# Patient Record
Sex: MALE | Race: WHITE | NOT HISPANIC OR LATINO | Employment: FULL TIME | URBAN - METROPOLITAN AREA
[De-identification: names, ages, dates, MRNs, and addresses within clinical notes are randomized per-mention and may not be internally consistent; named-entity substitution may affect disease eponyms.]

---

## 2018-01-31 ENCOUNTER — RECORDS - HEALTHEAST (OUTPATIENT)
Dept: LAB | Facility: CLINIC | Age: 58
End: 2018-01-31

## 2018-02-01 LAB — BACTERIA SPEC CULT: NO GROWTH

## 2018-06-08 ENCOUNTER — RECORDS - HEALTHEAST (OUTPATIENT)
Dept: LAB | Facility: CLINIC | Age: 58
End: 2018-06-08

## 2018-06-09 LAB — BACTERIA SPEC CULT: NO GROWTH

## 2019-12-23 ENCOUNTER — RECORDS - HEALTHEAST (OUTPATIENT)
Dept: ADMINISTRATIVE | Facility: OTHER | Age: 59
End: 2019-12-23

## 2019-12-23 ENCOUNTER — RECORDS - HEALTHEAST (OUTPATIENT)
Dept: LAB | Facility: CLINIC | Age: 59
End: 2019-12-23

## 2019-12-24 LAB
CCP AB SER IA-ACNC: 0.7 U/ML
RHEUMATOID FACT SERPL-ACNC: <15 IU/ML (ref 0–30)

## 2020-01-08 ENCOUNTER — RECORDS - HEALTHEAST (OUTPATIENT)
Dept: ADMINISTRATIVE | Facility: OTHER | Age: 60
End: 2020-01-08

## 2020-02-21 ENCOUNTER — RECORDS - HEALTHEAST (OUTPATIENT)
Dept: ADMINISTRATIVE | Facility: OTHER | Age: 60
End: 2020-02-21

## 2020-02-21 ENCOUNTER — RECORDS - HEALTHEAST (OUTPATIENT)
Dept: LAB | Facility: CLINIC | Age: 60
End: 2020-02-21

## 2020-02-22 LAB
ALBUMIN SERPL-MCNC: 4.3 G/DL (ref 3.5–5)
ANION GAP SERPL CALCULATED.3IONS-SCNC: 10 MMOL/L (ref 5–18)
BUN SERPL-MCNC: 17 MG/DL (ref 8–22)
CALCIUM SERPL-MCNC: 9 MG/DL (ref 8.5–10.5)
CHLORIDE BLD-SCNC: 108 MMOL/L (ref 98–107)
CO2 SERPL-SCNC: 24 MMOL/L (ref 22–31)
CREAT SERPL-MCNC: 0.88 MG/DL (ref 0.7–1.3)
GFR SERPL CREATININE-BSD FRML MDRD: >60 ML/MIN/1.73M2
GLUCOSE BLD-MCNC: 82 MG/DL (ref 70–125)
PHOSPHATE SERPL-MCNC: 2.5 MG/DL (ref 2.5–4.5)
POTASSIUM BLD-SCNC: 4.4 MMOL/L (ref 3.5–5)
SODIUM SERPL-SCNC: 142 MMOL/L (ref 136–145)
URATE SERPL-MCNC: 7.1 MG/DL (ref 3–8)

## 2020-02-28 ENCOUNTER — RECORDS - HEALTHEAST (OUTPATIENT)
Dept: ADMINISTRATIVE | Facility: OTHER | Age: 60
End: 2020-02-28

## 2020-03-02 ENCOUNTER — COMMUNICATION - HEALTHEAST (OUTPATIENT)
Dept: ADMINISTRATIVE | Facility: CLINIC | Age: 60
End: 2020-03-02

## 2020-03-05 ENCOUNTER — COMMUNICATION - HEALTHEAST (OUTPATIENT)
Dept: ADMINISTRATIVE | Facility: CLINIC | Age: 60
End: 2020-03-05

## 2020-03-06 ENCOUNTER — RECORDS - HEALTHEAST (OUTPATIENT)
Dept: ADMINISTRATIVE | Facility: OTHER | Age: 60
End: 2020-03-06

## 2020-03-11 ENCOUNTER — OFFICE VISIT - HEALTHEAST (OUTPATIENT)
Dept: RHEUMATOLOGY | Facility: CLINIC | Age: 60
End: 2020-03-11

## 2020-03-11 DIAGNOSIS — M25.50 POLYARTHRALGIA: ICD-10-CM

## 2020-03-11 LAB
ALBUMIN SERPL-MCNC: 3.9 G/DL (ref 3.5–5)
ALT SERPL W P-5'-P-CCNC: 12 U/L (ref 0–45)
BASOPHILS # BLD AUTO: 0 THOU/UL (ref 0–0.2)
BASOPHILS NFR BLD AUTO: 1 % (ref 0–2)
C REACTIVE PROTEIN LHE: 0.6 MG/DL (ref 0–0.8)
CREAT SERPL-MCNC: 0.97 MG/DL (ref 0.7–1.3)
EOSINOPHIL # BLD AUTO: 0.2 THOU/UL (ref 0–0.4)
EOSINOPHIL NFR BLD AUTO: 4 % (ref 0–6)
ERYTHROCYTE [DISTWIDTH] IN BLOOD BY AUTOMATED COUNT: 12 % (ref 11–14.5)
ERYTHROCYTE [SEDIMENTATION RATE] IN BLOOD BY WESTERGREN METHOD: 16 MM/HR (ref 0–15)
GFR SERPL CREATININE-BSD FRML MDRD: >60 ML/MIN/1.73M2
HCT VFR BLD AUTO: 38.6 % (ref 40–54)
HGB BLD-MCNC: 13.7 G/DL (ref 14–18)
LYMPHOCYTES # BLD AUTO: 1.1 THOU/UL (ref 0.8–4.4)
LYMPHOCYTES NFR BLD AUTO: 23 % (ref 20–40)
MCH RBC QN AUTO: 34.5 PG (ref 27–34)
MCHC RBC AUTO-ENTMCNC: 35.6 G/DL (ref 32–36)
MCV RBC AUTO: 97 FL (ref 80–100)
MONOCYTES # BLD AUTO: 0.5 THOU/UL (ref 0–0.9)
MONOCYTES NFR BLD AUTO: 10 % (ref 2–10)
NEUTROPHILS # BLD AUTO: 3.2 THOU/UL (ref 2–7.7)
NEUTROPHILS NFR BLD AUTO: 63 % (ref 50–70)
PLATELET # BLD AUTO: 274 THOU/UL (ref 140–440)
PMV BLD AUTO: 6.7 FL (ref 7–10)
RBC # BLD AUTO: 3.97 MILL/UL (ref 4.4–6.2)
URATE SERPL-MCNC: 7.7 MG/DL (ref 3–8)
WBC: 5.1 THOU/UL (ref 4–11)

## 2020-03-11 RX ORDER — IBUPROFEN 200 MG
TABLET ORAL
Status: SHIPPED | COMMUNITY
Start: 2020-03-11

## 2020-03-12 LAB — HCV AB SERPL QL IA: NEGATIVE

## 2020-03-25 ENCOUNTER — COMMUNICATION - HEALTHEAST (OUTPATIENT)
Dept: RHEUMATOLOGY | Facility: CLINIC | Age: 60
End: 2020-03-25

## 2020-04-01 ENCOUNTER — OFFICE VISIT - HEALTHEAST (OUTPATIENT)
Dept: RHEUMATOLOGY | Facility: CLINIC | Age: 60
End: 2020-04-01

## 2020-04-01 DIAGNOSIS — M15.0 PRIMARY OSTEOARTHRITIS INVOLVING MULTIPLE JOINTS: ICD-10-CM

## 2020-04-01 DIAGNOSIS — M25.50 POLYARTHRALGIA: ICD-10-CM

## 2020-04-02 ENCOUNTER — COMMUNICATION - HEALTHEAST (OUTPATIENT)
Dept: RHEUMATOLOGY | Facility: CLINIC | Age: 60
End: 2020-04-02

## 2020-04-02 DIAGNOSIS — M25.50 POLYARTHRALGIA: ICD-10-CM

## 2020-05-05 ENCOUNTER — COMMUNICATION - HEALTHEAST (OUTPATIENT)
Dept: ADMINISTRATIVE | Facility: CLINIC | Age: 60
End: 2020-05-05

## 2020-05-06 ENCOUNTER — OFFICE VISIT - HEALTHEAST (OUTPATIENT)
Dept: RHEUMATOLOGY | Facility: CLINIC | Age: 60
End: 2020-05-06

## 2020-05-06 DIAGNOSIS — M25.50 POLYARTHRALGIA: ICD-10-CM

## 2020-05-06 DIAGNOSIS — M15.0 PRIMARY OSTEOARTHRITIS INVOLVING MULTIPLE JOINTS: ICD-10-CM

## 2020-07-01 ENCOUNTER — COMMUNICATION - HEALTHEAST (OUTPATIENT)
Dept: RHEUMATOLOGY | Facility: CLINIC | Age: 60
End: 2020-07-01

## 2020-07-01 ENCOUNTER — OFFICE VISIT - HEALTHEAST (OUTPATIENT)
Dept: RHEUMATOLOGY | Facility: CLINIC | Age: 60
End: 2020-07-01

## 2020-07-01 DIAGNOSIS — M25.50 POLYARTHRALGIA: ICD-10-CM

## 2020-07-01 DIAGNOSIS — M15.0 PRIMARY OSTEOARTHRITIS INVOLVING MULTIPLE JOINTS: ICD-10-CM

## 2020-07-08 ENCOUNTER — COMMUNICATION - HEALTHEAST (OUTPATIENT)
Dept: RHEUMATOLOGY | Facility: CLINIC | Age: 60
End: 2020-07-08

## 2020-07-08 DIAGNOSIS — M25.50 POLYARTHRALGIA: ICD-10-CM

## 2020-07-08 DIAGNOSIS — M15.0 PRIMARY OSTEOARTHRITIS INVOLVING MULTIPLE JOINTS: ICD-10-CM

## 2020-07-15 ENCOUNTER — OFFICE VISIT - HEALTHEAST (OUTPATIENT)
Dept: RHEUMATOLOGY | Facility: CLINIC | Age: 60
End: 2020-07-15

## 2020-07-15 DIAGNOSIS — M25.50 POLYARTHRALGIA: ICD-10-CM

## 2020-07-15 DIAGNOSIS — I73.00 RAYNAUD'S PHENOMENON WITHOUT GANGRENE: ICD-10-CM

## 2020-07-15 DIAGNOSIS — M15.0 PRIMARY OSTEOARTHRITIS INVOLVING MULTIPLE JOINTS: ICD-10-CM

## 2020-09-16 ENCOUNTER — COMMUNICATION - HEALTHEAST (OUTPATIENT)
Dept: RHEUMATOLOGY | Facility: CLINIC | Age: 60
End: 2020-09-16

## 2020-09-16 DIAGNOSIS — M15.0 PRIMARY OSTEOARTHRITIS INVOLVING MULTIPLE JOINTS: ICD-10-CM

## 2020-09-16 DIAGNOSIS — M25.50 POLYARTHRALGIA: ICD-10-CM

## 2020-10-17 ENCOUNTER — COMMUNICATION - HEALTHEAST (OUTPATIENT)
Dept: RHEUMATOLOGY | Facility: CLINIC | Age: 60
End: 2020-10-17

## 2020-10-17 DIAGNOSIS — M15.0 PRIMARY OSTEOARTHRITIS INVOLVING MULTIPLE JOINTS: ICD-10-CM

## 2020-10-17 DIAGNOSIS — M25.50 POLYARTHRALGIA: ICD-10-CM

## 2020-11-09 ENCOUNTER — AMBULATORY - HEALTHEAST (OUTPATIENT)
Dept: LAB | Facility: CLINIC | Age: 60
End: 2020-11-09

## 2020-11-09 DIAGNOSIS — M25.50 POLYARTHRALGIA: ICD-10-CM

## 2020-11-09 LAB
ALBUMIN SERPL-MCNC: 4.4 G/DL (ref 3.5–5)
ALT SERPL W P-5'-P-CCNC: 14 U/L (ref 0–45)
CREAT SERPL-MCNC: 0.81 MG/DL (ref 0.7–1.3)
ERYTHROCYTE [DISTWIDTH] IN BLOOD BY AUTOMATED COUNT: 10.7 % (ref 11–14.5)
GFR SERPL CREATININE-BSD FRML MDRD: >60 ML/MIN/1.73M2
HCT VFR BLD AUTO: 39 % (ref 40–54)
HGB BLD-MCNC: 13.4 G/DL (ref 14–18)
MCH RBC QN AUTO: 35 PG (ref 27–34)
MCHC RBC AUTO-ENTMCNC: 34.4 G/DL (ref 32–36)
MCV RBC AUTO: 102 FL (ref 80–100)
PLATELET # BLD AUTO: 253 THOU/UL (ref 140–440)
PMV BLD AUTO: 6.5 FL (ref 7–10)
RBC # BLD AUTO: 3.84 MILL/UL (ref 4.4–6.2)
WBC: 6.3 THOU/UL (ref 4–11)

## 2020-11-10 ENCOUNTER — OFFICE VISIT - HEALTHEAST (OUTPATIENT)
Dept: RHEUMATOLOGY | Facility: CLINIC | Age: 60
End: 2020-11-10

## 2020-11-10 DIAGNOSIS — M15.0 PRIMARY OSTEOARTHRITIS INVOLVING MULTIPLE JOINTS: ICD-10-CM

## 2020-11-10 DIAGNOSIS — M25.50 POLYARTHRALGIA: ICD-10-CM

## 2020-11-11 ENCOUNTER — COMMUNICATION - HEALTHEAST (OUTPATIENT)
Dept: RHEUMATOLOGY | Facility: CLINIC | Age: 60
End: 2020-11-11

## 2020-11-11 DIAGNOSIS — M15.0 PRIMARY OSTEOARTHRITIS INVOLVING MULTIPLE JOINTS: ICD-10-CM

## 2020-11-11 DIAGNOSIS — M25.50 POLYARTHRALGIA: ICD-10-CM

## 2020-11-13 RX ORDER — CELECOXIB 100 MG/1
CAPSULE ORAL
Qty: 180 CAPSULE | Refills: 0 | Status: SHIPPED | OUTPATIENT
Start: 2020-11-13

## 2021-03-25 ENCOUNTER — OFFICE VISIT - HEALTHEAST (OUTPATIENT)
Dept: RHEUMATOLOGY | Facility: CLINIC | Age: 61
End: 2021-03-25

## 2021-03-25 DIAGNOSIS — M15.0 PRIMARY OSTEOARTHRITIS INVOLVING MULTIPLE JOINTS: ICD-10-CM

## 2021-03-25 DIAGNOSIS — M25.50 POLYARTHRALGIA: ICD-10-CM

## 2021-03-29 ENCOUNTER — AMBULATORY - HEALTHEAST (OUTPATIENT)
Dept: LAB | Facility: CLINIC | Age: 61
End: 2021-03-29

## 2021-03-29 DIAGNOSIS — M25.50 POLYARTHRALGIA: ICD-10-CM

## 2021-03-29 LAB
ALT SERPL W P-5'-P-CCNC: 17 U/L (ref 0–45)
CREAT SERPL-MCNC: 0.93 MG/DL (ref 0.7–1.3)
ERYTHROCYTE [DISTWIDTH] IN BLOOD BY AUTOMATED COUNT: 12.2 % (ref 11–14.5)
GFR SERPL CREATININE-BSD FRML MDRD: >60 ML/MIN/1.73M2
HCT VFR BLD AUTO: 41.7 % (ref 40–54)
HGB BLD-MCNC: 14.3 G/DL (ref 14–18)
MCH RBC QN AUTO: 33.5 PG (ref 27–34)
MCHC RBC AUTO-ENTMCNC: 34.3 G/DL (ref 32–36)
MCV RBC AUTO: 98 FL (ref 80–100)
PLATELET # BLD AUTO: 229 THOU/UL (ref 140–440)
PMV BLD AUTO: 9.2 FL (ref 7–10)
RBC # BLD AUTO: 4.27 MILL/UL (ref 4.4–6.2)
URATE SERPL-MCNC: 6 MG/DL (ref 3–8)
WBC: 5.7 THOU/UL (ref 4–11)

## 2021-04-14 ENCOUNTER — OFFICE VISIT - HEALTHEAST (OUTPATIENT)
Dept: RHEUMATOLOGY | Facility: CLINIC | Age: 61
End: 2021-04-14

## 2021-04-14 DIAGNOSIS — M15.0 PRIMARY OSTEOARTHRITIS INVOLVING MULTIPLE JOINTS: ICD-10-CM

## 2021-04-14 DIAGNOSIS — M25.50 POLYARTHRALGIA: ICD-10-CM

## 2021-06-04 VITALS — HEART RATE: 80 BPM | WEIGHT: 176 LBS | DIASTOLIC BLOOD PRESSURE: 76 MMHG | SYSTOLIC BLOOD PRESSURE: 116 MMHG

## 2021-06-04 VITALS — WEIGHT: 187 LBS | DIASTOLIC BLOOD PRESSURE: 62 MMHG | SYSTOLIC BLOOD PRESSURE: 114 MMHG

## 2021-06-05 VITALS — HEART RATE: 80 BPM | DIASTOLIC BLOOD PRESSURE: 78 MMHG | SYSTOLIC BLOOD PRESSURE: 126 MMHG | WEIGHT: 178 LBS

## 2021-06-05 VITALS — WEIGHT: 178 LBS | DIASTOLIC BLOOD PRESSURE: 64 MMHG | HEART RATE: 76 BPM | SYSTOLIC BLOOD PRESSURE: 100 MMHG

## 2021-06-06 NOTE — TELEPHONE ENCOUNTER
Date: 3/11/2020 Status: [facility]   Time: 10:00 AM Length: 20   Visit Type: CONSULT [[id number]] Copay: $0.00   Provider: Brianna Ramos MBBS

## 2021-06-06 NOTE — TELEPHONE ENCOUNTER
Dr Ramos    Pt is wondering if he can get in sooner because he is currently have a bad flare. He has seen a hand specialist and his PCP, but they are unable to help him. They do not know if it RA or gout.    Pt's appt is 03.26 and he is on the wait list as well.    He can be reached @ [phone].

## 2021-06-06 NOTE — TELEPHONE ENCOUNTER
Not sure If scanning already scanned or in the process.      Records from [place],  2/27 @ 1:41pm in rheum consult folder.

## 2021-06-06 NOTE — PROGRESS NOTES
"ASSESSMENT AND PLAN:  Pravin Chau 60 y.o. male is seen here on 03/11/20 for evaluation of painful joints, he has several signals suggestive of inflammatory joint disease though he is observation on the the Dosepak of prednisone was of little help a is significant in this regard.  We discussed various types of inflammatory joint diseases.  X-rays of the hands taken today, personally reviewed the films, reduced joint space in the MCPs 1 2 and 3 bilaterally with osteophyte formation sometimes this pattern is seen in calcium pyrophosphate disease.  I will ask him to take prednisone 20 mg daily for the next 15 days and we will get together thereafter major side effects including ocular metabolic bone related were reviewed.  Diagnoses and all orders for this visit:    Polyarthralgia  -     HM1(CBC and Differential)  -     Creatinine  -     ALT (SGPT)  -     Albumin  -     Hepatitis C Antibody (Anti-HCV)  -     Uric Acid  -     Erythrocyte Sedimentation Rate  -     C-Reactive Protein  -     XR Hands Bilateral 3 or More VWS; Future; Expected date: 03/11/2020  -     XR Hands Bilateral 3 or More VWS  -     HM1 (CBC with Diff)  -     predniSONE (DELTASONE) 20 MG tablet; Take 20 mg by mouth daily.  Dispense: 30 tablet; Refill: 0      HISTORY OF PRESENTING ILLNESS:  Pravin Chau, 60 y.o., male is here for evaluation of painful joints.  He is accompanied by his wife.  He reports pain starting in his hand specially on the left side, this happened 3 months ago gradual onset, subsequently involving the right hand, elbows.  When the first time he had this he was seen in orthopedics, hand surgery.  He was told that this may be gout that he has carried a diagnosis as noted later.  He recalls the x-rays were \"normal\" the orthopedic surgeon was reportedly somewhat pleasantly surprised to see how well his joints looked.  That it seems to him that was the reason he thought this might be gout.  He was given a Dosepak of steroids that " did not help him.  Subsequently as the pain got worse and affected the opposite hand he was seen in his primary physician's office.  This time he had further lab his anti-CCP antibody rheumatoid factor were done which were negative.  He was given another course of steroids that he just finished 2 weeks ago.  He does not think these have done much for him at all.  He has noted pain level to be severe interfering with day-to-day activities.  He has stiffness in the morning he can see the swelling as can his wife on the dorsum of his hand more so on the left than the right.  He also has lumbar spondylosis and recently had corticosteroid injections in that area.  He has noted no history personally or in the family of psoriasis ulcerative colitis or Crohn's disease.  He is's not a smoker alcohol socially, he is a shane by trade.  He has had appendicectomy in 2019.  He describes himself otherwise in good health.  Regarding the gout diagnosis he remembers it was 2001 he was in [place] when he had swelling of the right first MTP.  He recalls that the fluid obtained from that joint was thought to have crystals.  Subsequently 2005 the left knee swelled up and he had another episode of effusion and aspiration.  The most recent allopurinol prescription was that he took more than 1520 years ago although recently he was given another prescription to be started once he heals that he has not started that yet.  .  Further historical information and ADL limitations as noted in the multidimensional health assessment questionnaire attached in the EMR. Rest of the 13 system ROS is negative.     ALLERGIES:Patient has no known allergies.    PAST MEDICAL/ACTIVE PROBLEMS/MEDICATION/ FAMILY HISTORY/SOCIAL DATA:  The patient has a family history of  No past medical history on file.  Social History     Tobacco Use   Smoking Status Never Smoker   Smokeless Tobacco Never Used     There is no problem list on file for this patient.    Current  Outpatient Medications   Medication Sig Dispense Refill     amoxicillin (AMOXIL) 250 MG capsule 1 cap(s)       ibuprofen (ADVIL) 200 MG tablet 2 tabs       No current facility-administered medications for this visit.        COMPREHENSIVE EXAMINATION:  Vitals:    03/11/20 1021   BP: 114/62   Patient Site: Right Arm   Patient Position: Sitting   Cuff Size: Adult Regular   Weight: 187 lb (84.8 kg)     A well appearing alert oriented male. Vital data as noted above. His eyes without inflammation/scleromalacia. ENTwithout oral mucositis, thrush, nasal deformity, external ear redness, deformity. His neck is without lymphadenopathy and supple. Lungs normal sounds, no pleural rub. Heart auscultation normal rate, rhythm; no pericardial rub and murmurs. Abdomen soft, non tender, no organomegaly. Skin examined for heliotrope, malar area eruption, lupus pernio, periungual erythema, sclerodactyly, papules, erythema nodosum, purpura, nail pitting, onycholysis, and obvious psoriasis lesion. Neurological examination shows normal alertness, speech, facial symmetry, tone and power in upper and lower extremities. The joint examination is performed for swelling, tenderness, warmth, erythema, and range of motion in the following joints: DIPs, PIPs, MCPs, wrists, first CMC's, elbows, shoulders, hips, knees, ankles, feet; spine for range of motion and paraspinal muscles for tenderness. The salient  findings are: He has tenderness and swelling in his metatarsophalangeal joints #1 2 and 3 on both sides significantly worse on the left, he has tenderness and swelling in the PIPs of the same hands.  He does not have dactylitis of digits, toes, he does not have nail pitting, onycholysis.  His he does not have dactylitis of the toes, no enthesitis around the ankles, knees.  He has tenderness along the lateral epicondyles of both his elbows.  He is tender in the lumbosacral area paraspinally.  He has several tattoos.    LAB / IMAGING  DATA:  Albumin   Date Value Ref Range Status   02/21/2020 4.3 3.5 - 5.0 g/dL Final     Creatinine   Date Value Ref Range Status   02/21/2020 0.88 0.70 - 1.30 mg/dL Final       No results found for: WBC, HGB, PLT    Lab Results   Component Value Date    RF <15.0 12/23/2019

## 2021-06-06 NOTE — TELEPHONE ENCOUNTER
External - [facility] [phone]  Referring Provider: Ean Rosales MD  DX: gout and left hand pain    Ref./rec. Were received on 2/27 @ 1:41 in there rheum folder.

## 2021-06-06 NOTE — TELEPHONE ENCOUNTER
Do we have any records on this pt? Or were they requested? I don't see anything in the chart regarding his upcoming appt

## 2021-06-07 NOTE — TELEPHONE ENCOUNTER
Pt states he is having increased pain in his hands, goes up his arms to shoulders. 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 Pt states prednisone did nothing for the pain in the past, duloxetine has not helped- did explain it can take up to 6 weeks to feel the full effects of this as pt has been on this since 4/1/20. Pt states symptoms seem to be getting worse not better and would like to try something else.     Recommended pt do a virtural visit again with Dr Ramos as medications are not working and symptoms are getting worse. Scheduled pt for tomorrow between 12-1pm with Dr Ramos- pt can only do a phone visit as he will be on a job site and will be taking a break to take this call.

## 2021-06-07 NOTE — TELEPHONE ENCOUNTER
To reduce the risk of the COVID-19 outbreak pt is offered to reschedule/ or a telephone visit.  Pt states he  would prefer a telephone visit from Dr. Ramos 9- 10  Pt has no other question at this time.

## 2021-06-07 NOTE — PROGRESS NOTES
"Pravin Chau is a 60 y.o. male who is being evaluated via a billable telephone visit.      The patient has been notified of following:     \"This telephone visit will be conducted via a call between you and your physician/provider. We have found that certain health care needs can be provided without the need for a physical exam.  This service lets us provide the care you need with a short phone conversation.  If a prescription is necessary we can send it directly to your pharmacy.  If lab work is needed we can place an order for that and you can then stop by our lab to have the test done at a later time.    Telephone visits are billed at different rates depending on your insurance coverage. During this emergency period, for some insurers they may be billed the same as an in-person visit.  Please reach out to your insurance provider with any questions.    If during the course of the call the physician/provider feels a telephone visit is not appropriate, you will not be charged for this service.\"    Patient has given verbal consent to a Telephone visit? Yes    What phone number would you like to be contacted at? [phone]     Patient would like to receive their AVS by AVS Preference: Romulo. declined     ASSESSMENT AND PLAN:    Diagnoses and all orders for this visit:    Primary osteoarthritis involving multiple joints  -     celecoxib (CELEBREX) 100 MG capsule; Take 1 capsule (100 mg total) by mouth 2 (two) times a day.  Dispense: 60 capsule; Refill: 1    Polyarthralgia  -     celecoxib (CELEBREX) 100 MG capsule; Take 1 capsule (100 mg total) by mouth 2 (two) times a day.  Dispense: 60 capsule; Refill: 1          HISTORY OF PRESENTING ILLNESS:  Pravin Chau 60 y.o. is evaluated here via video link.  He has polyarthralgia in association with degenerative arthropathy, with some signal suggestive of calcium pyrophosphate disease affecting his metacarpophalangeal joints especially #3 on both sides, interfering " with day-to-day activities, gone on for the past 6 months, moderately severe, radiating toward the elbows, no change with prednisone up to 20 mg daily, duloxetine did not help him at all, he took that for 4 weeks.  Tylenol, ibuprofen, Aleve have not helped him.  This is interfering with his day-to-day activities.  Does not have history of psoriasis.   ROS enquiry held for fever, ocular symptoms, rash, headache,  GI issues.  We looked at various options.  In the limitations with the pandemic the following plan is obtained #1 he will try Celebrex major side effects reviewed #2 consider corticosteroid injections as quickly as we can with the current environment #3 should Celebrex not work he will call his primary physician in the interim to get gentle narcotic if deemed appropriate.  #4 he will discontinue duloxetine.  Today we also discussed the issues related to the current pandemic, the pros and cons of the current treatment plan, the CDC guidelines such as social distancing washing the hands covering the cough.  ALLERGIES:Patient has no known allergies.    PAST MEDICAL/ACTIVE PROBLEMS/MEDICATION/SOCIAL DATA  No past medical history on file.  Social History     Tobacco Use   Smoking Status Never Smoker   Smokeless Tobacco Never Used     Patient Active Problem List   Diagnosis     Polyarthralgia     Primary osteoarthritis involving multiple joints     Current Outpatient Medications   Medication Sig Dispense Refill     amoxicillin (AMOXIL) 250 MG capsule 1 cap(s)       DULoxetine (CYMBALTA) 30 MG capsule Take 1 capsule (30 mg total) by mouth daily. 30 capsule 2     ibuprofen (ADVIL) 200 MG tablet 2 tabs       No current facility-administered medications for this visit.          EXAMINATION:   Phone visit  LAB / IMAGING DATA:  ALT   Date Value Ref Range Status   03/11/2020 12 0 - 45 U/L Final     Albumin   Date Value Ref Range Status   03/11/2020 3.9 3.5 - 5.0 g/dL Final   02/21/2020 4.3 3.5 - 5.0 g/dL Final      Creatinine   Date Value Ref Range Status   03/11/2020 0.97 0.70 - 1.30 mg/dL Final   02/21/2020 0.88 0.70 - 1.30 mg/dL Final       WBC   Date Value Ref Range Status   03/11/2020 5.1 4.0 - 11.0 thou/uL Final     Hemoglobin   Date Value Ref Range Status   03/11/2020 13.7 (L) 14.0 - 18.0 g/dL Final     Platelets   Date Value Ref Range Status   03/11/2020 274 140 - 440 thou/uL Final       Lab Results   Component Value Date    RF <15.0 12/23/2019    SEDRATE 16 (H) 03/11/2020     Duration of the call:7  Minutes  Call start: 101  pm  Call end:   109 pm

## 2021-06-07 NOTE — PROGRESS NOTES
"Pravin Chau is a 60 y.o. male who is being evaluated via a billable telephone visit.      The patient has been notified of following:     \"This telephone visit will be conducted via a call between you and your physician/provider. We have found that certain health care needs can be provided without the need for a physical exam.  This service lets us provide the care you need with a short phone conversation.  If a prescription is necessary we can send it directly to your pharmacy.  If lab work is needed we can place an order for that and you can then stop by our lab to have the test done at a later time.    If during the course of the call the physician/provider feels a telephone visit is not appropriate, you will not be charged for this service.\"     Patient has given verbal consent to a Telephone visit? Yes    Pravin Chau complains of    Chief Complaint   Patient presents with     Follow-up       I have reviewed and updated the patient's Past Medical History, Social History, Family History and Medication List.    ALLERGIES  Patient has no known allergies.    Additional provider notes:     Rhonda España CMA        ASSESSMENT AND PLAN:  Pravin Chau 60 y.o. male is seen here on 04/01/20 is evaluated today on the phone line, subsequent to recent visit where he has been found to have degenerative joint disease affecting the MCP 2 and 3 especially, prednisone 20 mg daily gave him no more than 20% relief.  Various options were reviewed.  In the current environment we will go for duloxetine major side effects were reviewed.  Once the pandemic restriction settle down he would like to try corticosteroid injection we will meet here in 3 months.           Diagnoses and all orders for this visit:    Polyarthralgia  -     DULoxetine (CYMBALTA) 30 MG capsule; Take 1 capsule (30 mg total) by mouth daily.  Dispense: 30 capsule; Refill: 2    Primary osteoarthritis involving multiple joints  -     DULoxetine (CYMBALTA) 30 " "MG capsule; Take 1 capsule (30 mg total) by mouth daily.  Dispense: 30 capsule; Refill: 2      HISTORY OF PRESENTING ILLNESS:  Pravin Chau, 60 y.o., male is evaluated today on the phone line for follow-up of recent evaluation of painful joints.  He has taken 20 mg of prednisone daily.  He has not had side effects.  He is not all that impressed by its benefit.  He thinks at best there is a 20% improvement in swelling and pain.  This interferes with day-to-day activity at.  Recent lab and x-ray data are reviewed with him.  He is accompanied by his wife.  He reports pain starting in his hand specially on the left side, this happened 3 months ago gradual onset, subsequently involving the right hand, elbows.  When the first time he had this he was seen in orthopedics, hand surgery.  He was told that this may be gout that he has carried a diagnosis as noted later.  He recalls the x-rays were \"normal\" the orthopedic surgeon was reportedly somewhat pleasantly surprised to see how well his joints looked.  That it seems to him that was the reason he thought this might be gout.  He was given a Dosepak of steroids that did not help him.  Subsequently as the pain got worse and affected the opposite hand he was seen in his primary physician's office.  This time he had further lab his anti-CCP antibody rheumatoid factor were done which were negative.  He was given another course of steroids that he just finished 2 weeks ago.  He does not think these have done much for him at all.  He has noted pain level to be severe interfering with day-to-day activities.  He has stiffness in the morning he can see the swelling as can his wife on the dorsum of his hand more so on the left than the right.  He also has lumbar spondylosis and recently had corticosteroid injections in that area.  He has noted no history personally or in the family of psoriasis ulcerative colitis or Crohn's disease.  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  He has had appendicectomy in 2019.  He describes himself otherwise in good health.  Regarding the gout diagnosis he remembers it was 2001 he was in [place] when he had swelling of the right first MTP.  He recalls that the fluid obtained from that joint was thought to have crystals.  Subsequently 2005 the left knee swelled up and he had another episode of effusion and aspiration.  The most recent allopurinol prescription was that he took more than 1520 years ago although recently he was given another prescription to be started once he heals that he has not started that yet.   ALLERGIES:Patient has no known allergies.    PAST MEDICAL/ACTIVE PROBLEMS/MEDICATION/ FAMILY HISTORY/SOCIAL DATA:  The patient has a family history of  No past medical history on file.  Social History     Tobacco Use   Smoking Status Never Smoker   Smokeless Tobacco Never Used     There is no problem list on file for this patient.    Current Outpatient Medications   Medication Sig Dispense Refill     amoxicillin (AMOXIL) 250 MG capsule 1 cap(s)       ibuprofen (ADVIL) 200 MG tablet 2 tabs       predniSONE (DELTASONE) 20 MG tablet Take 20 mg by mouth daily. 30 tablet 0     No current facility-administered medications for this visit.        COMPREHENSIVE EXAMINATION: No examination since there is a phone visit    LAB / IMAGING DATA:  ALT   Date Value Ref Range Status   03/11/2020 12 0 - 45 U/L Final     Albumin   Date Value Ref Range Status   03/11/2020 3.9 3.5 - 5.0 g/dL Final   02/21/2020 4.3 3.5 - 5.0 g/dL Final     Creatinine   Date Value Ref Range Status   03/11/2020 0.97 0.70 - 1.30 mg/dL Final   02/21/2020 0.88 0.70 - 1.30 mg/dL Final       WBC   Date Value Ref Range Status   03/11/2020 5.1 4.0 - 11.0 thou/uL Final     Hemoglobin   Date Value Ref Range Status   03/11/2020 13.7 (L) 14.0 - 18.0 g/dL Final     Platelets   Date Value Ref Range Status   03/11/2020 274 140 - 440 thou/uL Final       Lab Results   Component  Value Date    RF <15.0 12/23/2019    SEDRATE 16 (H) 03/11/2020        Time on phone line: 9: 03 minutes

## 2021-06-07 NOTE — TELEPHONE ENCOUNTER
Patient is calling because the medication isn't helping the hand pain.  Can Dr. Ramos prescribe something else to get him by until he can come I for injections?

## 2021-06-09 NOTE — PROGRESS NOTES
"Pravin Chau is a 60 y.o. male who is being evaluated via a billable telephone visit.      The patient has been notified of following:     \"This telephone visit will be conducted via a call between you and your physician/provider. We have found that certain health care needs can be provided without the need for a physical exam.  This service lets us provide the care you need with a short phone conversation.  If a prescription is necessary we can send it directly to your pharmacy.  If lab work is needed we can place an order for that and you can then stop by our lab to have the test done at a later time.    Telephone visits are billed at different rates depending on your insurance coverage. During this emergency period, for some insurers they may be billed the same as an in-person visit.  Please reach out to your insurance provider with any questions.    If during the course of the call the physician/provider feels a telephone visit is not appropriate, you will not be charged for this service.\"    Patient has given verbal consent to a Telephone visit? Yes    What phone number would you like to be contacted at?[phone]     Patient would like to receive their AVS by AVS Preference: Romulo.      ASSESSMENT AND PLAN:    Diagnoses and all orders for this visit:    Primary osteoarthritis involving multiple joints  -     XR Elbow Right 3 or More VWS; Future; Expected date: 07/06/2020  -     XR Elbow Left 3 or More VWS; Future; Expected date: 07/06/2020  -     XR Elbow Right 3 or More VWS  -     XR Elbow Left 3 or More VWS    Polyarthralgia  -     XR Elbow Right 3 or More VWS; Future; Expected date: 07/06/2020  -     XR Elbow Left 3 or More VWS; Future; Expected date: 07/06/2020  -     XR Elbow Right 3 or More VWS  -     XR Elbow Left 3 or More VWS          HISTORY OF PRESENTING ILLNESS:  Pravin Chau 60 y.o. is evaluated here via phone link.  He continues to hurt in his hands, MCPs 2 and 3 especially, elbows now, " he has definite evidence of degenerative joint disease the key question of whether he has a superimposed inflammatory process is been looked into.  In the past he had not noted any response to corticosteroids by mouth such as 20 mg of prednisone.  Duloxetine is taken the edge off the pain.  He works as a shane.  I have suggested that he considers the option of corticosteroid injection given the degenerative morphology.  1 option may be for future reference to further image his hands such as with an MRI.  We will meet in the next near future.  He will have x-rays of the elbows taken prior to that.  Meanwhile continue duloxetine.    ROS enquiry held for fever, ocular symptoms, rash, headache,  GI issues.  Today we also discussed the issues related to the current pandemic, the pros and cons of the current treatment plan, the CDC guidelines such as social distancing washing the hands covering the cough.  ALLERGIES:Patient has no known allergies.    PAST MEDICAL/ACTIVE PROBLEMS/MEDICATION/SOCIAL DATA  No past medical history on file.  Social History     Tobacco Use   Smoking Status Never Smoker   Smokeless Tobacco Never Used     Patient Active Problem List   Diagnosis     Polyarthralgia     Primary osteoarthritis involving multiple joints     Current Outpatient Medications   Medication Sig Dispense Refill     amoxicillin (AMOXIL) 250 MG capsule 1 cap(s)       DULoxetine (CYMBALTA) 30 MG capsule Take 1 capsule (30 mg total) by mouth daily. (Patient taking differently: Take 30 mg by mouth 2 (two) times a day. ) 30 capsule 2     ibuprofen (ADVIL) 200 MG tablet 2 tabs       celecoxib (CELEBREX) 100 MG capsule Take 1 capsule (100 mg total) by mouth 2 (two) times a day. 60 capsule 1     No current facility-administered medications for this visit.          EXAMINATION: He is comfortable sounding alert oriented speech is fluent, he does not sound dyspneic or in pain.    LAB / IMAGING DATA:  ALT   Date Value Ref Range Status    03/11/2020 12 0 - 45 U/L Final     Albumin   Date Value Ref Range Status   03/11/2020 3.9 3.5 - 5.0 g/dL Final   02/21/2020 4.3 3.5 - 5.0 g/dL Final     Creatinine   Date Value Ref Range Status   03/11/2020 0.97 0.70 - 1.30 mg/dL Final   02/21/2020 0.88 0.70 - 1.30 mg/dL Final       WBC   Date Value Ref Range Status   03/11/2020 5.1 4.0 - 11.0 thou/uL Final     Hemoglobin   Date Value Ref Range Status   03/11/2020 13.7 (L) 14.0 - 18.0 g/dL Final     Platelets   Date Value Ref Range Status   03/11/2020 274 140 - 440 thou/uL Final       Lab Results   Component Value Date    RF <15.0 12/23/2019    SEDRATE 16 (H) 03/11/2020     Duration of the call:9  Minutes  Call start: 114  pm  Call end:   123pm

## 2021-06-09 NOTE — TELEPHONE ENCOUNTER
Pt notified and scheduled 7/15/2020  Per Dr. Ramos, 2 weeks f/up appointment as an OV for injection. SPR # given- pt will call to make x-ray appt.   Rhonda España CMA [facility]ology 7/1/2020 1:57 PM

## 2021-06-09 NOTE — PROGRESS NOTES
ASSESSMENT AND PLAN:  Pravin Chau 60 y.o. male is seen here on 07/15/20 for evaluation of painful joints of the hands worse on the left third MCP with degenerative joint morphology on x-rays.  He does not have evidence of autoimmunity serologically.  He has Raynaud's which will be a new diagnosis for him, he brings pictures on his cell phone which are diagnostic.  Management principles of which were reviewed.  Follow-up in 3 to 4 months or sooner.  Diagnoses and all orders for this visit:    Primary osteoarthritis involving multiple joints  -     triamcinolone acetonide 40 mg/mL injection 20 mg (KENALOG-40)    Raynaud's phenomenon without gangrene    Polyarthralgia          HISTORY OF PRESENTING ILLNESS ON 07/15/20 :  Pravin Chau 60 y.o. is here for a moderately severe flare up of pain. Here for a moderately severe flare up of pain.  Joints affected include multiple joints.  This is especially true for his left third MCP.  He has degenerative joint disease there.  He has not had good response to prednisone by mouth.  This has gone on for several weeks. Pain is described as sharp. It is worse with activity at times bedtime..  Her symptoms are moderately severe. The symptoms are progressive.  Associated findings  /do not include: swelling, rash.  There is no associated recent fall or trauma.  More recently he has noted a Raynaud's, he did bring in pictures on his cell phone.  Which are diagnostic of Raynaud's.  He does use of vibrating tools the for work.  Over-the-counter treatment to date has been without significant relief.    Further historical information, including ROS and limitation in activities as noted in the multidimensional health assessment questionnaire scanned in the EMR and in the assessment and plan section.    ALLERGIES:Patient has no known allergies.    PAST MEDICAL/ACTIVE PROBLEMS/MEDICATION/SOCIAL DATA  No past medical history on file.  Social History     Tobacco Use   Smoking Status Never  Smoker   Smokeless Tobacco Never Used     Patient Active Problem List   Diagnosis     Polyarthralgia     Primary osteoarthritis involving multiple joints     Current Outpatient Medications   Medication Sig Dispense Refill     amoxicillin (AMOXIL) 250 MG capsule 1 cap(s)       celecoxib (CELEBREX) 100 MG capsule TAKE 1 CAPSULE(100 MG) BY MOUTH TWICE DAILY 60 capsule 1     DULoxetine (CYMBALTA) 30 MG capsule Take 1 capsule (30 mg total) by mouth daily. (Patient taking differently: Take 30 mg by mouth 2 (two) times a day. ) 30 capsule 2     ibuprofen (ADVIL) 200 MG tablet 2 tabs       Current Facility-Administered Medications   Medication Dose Route Frequency Provider Last Rate Last Dose     triamcinolone acetonide 40 mg/mL injection 20 mg (KENALOG-40)  20 mg Intra-articular Once Brianna Ramos MBBS             DETAILED EXAMINATION  07/15/20  :  Vitals:    07/15/20 1251   BP: 116/76   Patient Site: Right Arm   Patient Position: Sitting   Cuff Size: Adult Regular   Pulse: 80   Weight: 176 lb (79.8 kg)     Alert oriented. Head including the face is examined for malar rash, heliotropes, scarring, lupus pernio. Eyes examined for redness such as in episcleritis/scleritis, periorbital lesions.   Neck/ Face examined for parotid gland swelling, range of motion of neck.  Left upper and lower and right upper and lower extremities examined for tenderness, swelling, warmth of the appendicular joints, range of motion, edema, rash.  Some of the important findings included: Bony hypertrophy of third MCPs bilaterally worse on the left side which is significantly more tender.  Does not have Raynaud's.  There is no sclerodactyly.           LAB / IMAGING DATA:  ALT   Date Value Ref Range Status   03/11/2020 12 0 - 45 U/L Final     Albumin   Date Value Ref Range Status   03/11/2020 3.9 3.5 - 5.0 g/dL Final   02/21/2020 4.3 3.5 - 5.0 g/dL Final     Creatinine   Date Value Ref Range Status   03/11/2020 0.97 0.70 - 1.30 mg/dL Final    02/21/2020 0.88 0.70 - 1.30 mg/dL Final       WBC   Date Value Ref Range Status   03/11/2020 5.1 4.0 - 11.0 thou/uL Final     Hemoglobin   Date Value Ref Range Status   03/11/2020 13.7 (L) 14.0 - 18.0 g/dL Final     Platelets   Date Value Ref Range Status   03/11/2020 274 140 - 440 thou/uL Final       Lab Results   Component Value Date    RF <15.0 12/23/2019    SEDRATE 16 (H) 03/11/2020

## 2021-06-13 NOTE — PROGRESS NOTES
ASSESSMENT AND PLAN:  Pravin Chau 60 y.o. male is seen here on 11/10/20 for evaluation of right hand pain epicentered at the third MCP, he has degenerative joint disease, he would like to proceed local injection given improvement of the left hand in a similar circumstance.  After pros and cons were outlined 10 mg of Kenalog injected into the right third MCP.  He is to follow-up here in 3 to 4 months.  Diagnoses and all orders for this visit:    Primary osteoarthritis involving multiple joints  -     triamcinolone acetonide 40 mg/mL injection 10 mg (KENALOG-40)    Polyarthralgia          HISTORY OF PRESENTING ILLNESS ON 11/10/20 :  Pravin Chau 60 y.o. is here for a moderately severe flare up of pain. Here for a moderately severe flare up of pain.  Joints affected include the right MCP(s): 3rd. This has gone on for several weeks. Pain is described as sharp. It is worse with activity at times bedtime..  Her symptoms are moderately severe. The symptoms are progressive.  Associated findings  /do not include: swelling, rash.  There is no associated recent fall or trauma.  Over-the-counter treatment to date has been without significant relief.    Further historical information, including ROS and limitation in activities as noted in the multidimensional health assessment questionnaire scanned in the EMR and in the assessment and plan section.    ALLERGIES:Patient has no known allergies.    PAST MEDICAL/ACTIVE PROBLEMS/MEDICATION/SOCIAL DATA  No past medical history on file.  Social History     Tobacco Use   Smoking Status Never Smoker   Smokeless Tobacco Never Used     Patient Active Problem List   Diagnosis     Polyarthralgia     Primary osteoarthritis involving multiple joints     Current Outpatient Medications   Medication Sig Dispense Refill     amoxicillin (AMOXIL) 250 MG capsule 1 cap(s)       celecoxib (CELEBREX) 100 MG capsule TAKE 1 CAPSULE(100 MG) BY MOUTH TWICE DAILY 60 capsule 0     DULoxetine (CYMBALTA)  30 MG capsule Take 1 capsule (30 mg total) by mouth daily. (Patient taking differently: Take 30 mg by mouth 2 (two) times a day. ) 30 capsule 2     ibuprofen (ADVIL) 200 MG tablet 2 tabs       No current facility-administered medications for this visit.          DETAILED EXAMINATION  11/10/20  :  Vitals:    11/10/20 1059   BP: 126/78   Patient Site: Right Arm   Patient Position: Sitting   Cuff Size: Adult Regular   Pulse: 80   Weight: 178 lb (80.7 kg)     Alert oriented. Head including the face is examined for malar rash, heliotropes, scarring, lupus pernio. Eyes examined for redness such as in episcleritis/scleritis, periorbital lesions.   Neck/ Face examined for parotid gland swelling, range of motion of neck.  Left upper and lower and right upper and lower extremities examined for tenderness, swelling, warmth of the appendicular joints, range of motion, edema, rash.  Some of the important findings included: Marked tenderness of the right third MCP, with bony hypertrophy in contrast to the left side with there is bony hypertrophy but no lumbar tenderness which he has had an local injection done in the past.           LAB / IMAGING DATA:  ALT   Date Value Ref Range Status   11/09/2020 14 0 - 45 U/L Final   03/11/2020 12 0 - 45 U/L Final     Albumin   Date Value Ref Range Status   11/09/2020 4.4 3.5 - 5.0 g/dL Final   03/11/2020 3.9 3.5 - 5.0 g/dL Final   02/21/2020 4.3 3.5 - 5.0 g/dL Final     Creatinine   Date Value Ref Range Status   11/09/2020 0.81 0.70 - 1.30 mg/dL Final   03/11/2020 0.97 0.70 - 1.30 mg/dL Final   02/21/2020 0.88 0.70 - 1.30 mg/dL Final       WBC   Date Value Ref Range Status   11/09/2020 6.3 4.0 - 11.0 thou/uL Final   03/11/2020 5.1 4.0 - 11.0 thou/uL Final     Hemoglobin   Date Value Ref Range Status   11/09/2020 13.4 (L) 14.0 - 18.0 g/dL Final   03/11/2020 13.7 (L) 14.0 - 18.0 g/dL Final     Platelets   Date Value Ref Range Status   11/09/2020 253 140 - 440 thou/uL Final   03/11/2020 274 140  - 440 thou/uL Final       Lab Results   Component Value Date    RF <15.0 12/23/2019    SEDRATE 16 (H) 03/11/2020

## 2021-06-16 PROBLEM — M15.0 PRIMARY OSTEOARTHRITIS INVOLVING MULTIPLE JOINTS: Chronic | Status: ACTIVE | Noted: 2020-04-01

## 2021-06-16 PROBLEM — M25.50 POLYARTHRALGIA: Chronic | Status: ACTIVE | Noted: 2020-04-01

## 2021-06-16 NOTE — PROGRESS NOTES
This document was created using a software with less than 100% fidelity, at times resulting in unintended, even erroneous syntax and grammar.  The reader is advised to keep this under consideration while reviewing, interpreting this note.      ASSESSMENT AND PLAN:  Pravin Chau 61 y.o. male is seen here on 04/14/21 for evaluation of pain in his left ankle, right third MCP, he has evidence of degenerative joint disease.  He would like to proceed local injection done with 10 mg of Kenalog into the third MCP under ultrasound guidance, and 40 mg Kenalog into the left knee anterolateral approach after pros and cons were reviewed.  He has in the past carried a diagnosis of gout from elsewhere.  We discussed however that is typically diagnosed.  Should he notice such events he will call back for sooner follow-up otherwise we will meet in 4 to 6 months.  Diagnoses and all orders for this visit:    Primary osteoarthritis involving multiple joints  -     triamcinolone acetonide 40 mg/mL injection 40 mg (KENALOG-40)  -     triamcinolone acetonide 40 mg/mL injection 10 mg (KENALOG-40)  -     Discontinue: triamcinolone acetonide 40 mg/mL injection 40 mg (KENALOG-40)    Polyarthralgia  -     triamcinolone acetonide 40 mg/mL injection 40 mg (KENALOG-40)  -     triamcinolone acetonide 40 mg/mL injection 10 mg (KENALOG-40)  -     Discontinue: triamcinolone acetonide 40 mg/mL injection 40 mg (KENALOG-40)          HISTORY OF PRESENTING ILLNESS ON 04/14/21 :  Pravin Chau 61 y.o. is here for a moderately severe flare up of pain. Here for a moderately severe flare up of pain.  Joints affected include multiple joints and Left foot, right third MCP. This has gone on for several weeks. Pain is described as sharp. It is worse with activity at times bedtime..  Her symptoms are moderately severe. The symptoms are progressive.  Associated findings  do not include: swelling, rash.  There is no associated recent fall or trauma.   Over-the-counter treatment to date has been without significant relief.    Further historical information, including ROS and limitation in activities as noted in the multidimensional health assessment questionnaire scanned in the EMR and in the assessment and plan section.  He recalls at one point he was thought to have gout, he wonders if what he has now is also gout, though he has not noted the original symptoms that were associated with the diagnosis of gout.  In the past he has had x-rays hands showing degenerative changes in the metacarpophalangeal joints raising the question of pseudogout.    ALLERGIES:Patient has no known allergies.    PAST MEDICAL/ACTIVE PROBLEMS/MEDICATION/SOCIAL DATA  No past medical history on file.  Social History     Tobacco Use   Smoking Status Never Smoker   Smokeless Tobacco Never Used     Patient Active Problem List   Diagnosis     Polyarthralgia     Primary osteoarthritis involving multiple joints     Current Outpatient Medications   Medication Sig Dispense Refill     ibuprofen (ADVIL) 200 MG tablet 2 tabs       celecoxib (CELEBREX) 100 MG capsule TAKE 1 CAPSULE BY MOUTH TWICE DAILY 180 capsule 0     Current Facility-Administered Medications   Medication Dose Route Frequency Provider Last Rate Last Admin     triamcinolone acetonide 40 mg/mL injection 10 mg (KENALOG-40)  10 mg Intra-articular Once Brianna Ramos MBBS         triamcinolone acetonide 40 mg/mL injection 40 mg (KENALOG-40)  40 mg Intra-articular Once Brianna Ramos MBBS         triamcinolone acetonide 40 mg/mL injection 40 mg (KENALOG-40)  40 mg Intra-articular Once Brianna Ramos MBBS             DETAILED EXAMINATION  04/14/21  :  Vitals:    04/14/21 1050   BP: 100/64   Pulse: 76   Weight: 178 lb (80.7 kg)     Alert oriented. Head including the face is examined for malar rash, heliotropes, scarring, lupus pernio. Eyes examined for redness such as in episcleritis/scleritis, periorbital lesions.   Neck/ Face examined for  parotid gland swelling, range of motion of neck.  Left upper and lower and right upper and lower extremities examined for tenderness, swelling, warmth of the appendicular joints, range of motion, edema, rash.  Some of the important findings included: He has warmth of the left knee on medial aspect he has dysesthesia on the medial aspect the left knee, marked joint line tenderness of the left knee.  He has tenderness in the right third MCP.           LAB / IMAGING DATA:  ALT   Date Value Ref Range Status   03/29/2021 17 0 - 45 U/L Final   11/09/2020 14 0 - 45 U/L Final   03/11/2020 12 0 - 45 U/L Final     Albumin   Date Value Ref Range Status   11/09/2020 4.4 3.5 - 5.0 g/dL Final   03/11/2020 3.9 3.5 - 5.0 g/dL Final   02/21/2020 4.3 3.5 - 5.0 g/dL Final     Creatinine   Date Value Ref Range Status   03/29/2021 0.93 0.70 - 1.30 mg/dL Final   11/09/2020 0.81 0.70 - 1.30 mg/dL Final   03/11/2020 0.97 0.70 - 1.30 mg/dL Final       WBC   Date Value Ref Range Status   03/29/2021 5.7 4.0 - 11.0 thou/uL Final   11/09/2020 6.3 4.0 - 11.0 thou/uL Final     Hemoglobin   Date Value Ref Range Status   03/29/2021 14.3 14.0 - 18.0 g/dL Final   11/09/2020 13.4 (L) 14.0 - 18.0 g/dL Final   03/11/2020 13.7 (L) 14.0 - 18.0 g/dL Final     Platelets   Date Value Ref Range Status   03/29/2021 229 140 - 440 thou/uL Final   11/09/2020 253 140 - 440 thou/uL Final   03/11/2020 274 140 - 440 thou/uL Final       Lab Results   Component Value Date    RF <15.0 12/23/2019    SEDRATE 16 (H) 03/11/2020

## 2021-06-16 NOTE — PROGRESS NOTES
"Pravin Chau is a 61 y.o. male who is being evaluated via a billable video visit.      How would you like to obtain your AVS? Mail a copy.  If dropped from the video visit, the video invitation should be resent by: Text to cell phone: [phone]  Will anyone else be joining your video visit? No      Video Start Time: 4:24 PM  Video-Visit Details    Type of service:  Video Visit    Video End Time (time video stopped): 4:32 PM  Originating Location (pt. Location): Home    [facility]     Platform used for Video Visit: [employer]      This document was created using a software with less than 100% fidelity, at times resulting in unintended, even erroneous syntax and grammar.  The reader is advised to keep this under consideration while reviewing, interpreting this note.           ASSESSMENT AND PLAN:    Diagnoses and all orders for this visit:    Primary osteoarthritis involving multiple joints  -     XR Knees Bilateral 1 Or 2 VWS; Future; Expected date: 03/29/2021  -     XR Knees Bilateral 1 Or 2 VWS    Polyarthralgia  -     XR Knees Bilateral 1 Or 2 VWS; Future; Expected date: 03/29/2021  -     HM2(CBC w/o Differential); Future; Expected date: 03/29/2021  -     Uric Acid; Future; Expected date: 03/29/2021  -     Creatinine; Future; Expected date: 03/29/2021  -     ALT (SGPT); Future; Expected date: 03/29/2021  -     XR Knees Bilateral 1 Or 2 VWS        Follow up in 1 months      HISTORY OF PRESENTING ILLNESS:  Pravin Chau 61 y.o. is evaluated here via video/audio link.  This patient has polyarthralgias, with several features of osteoarthritis, he has been hurting more in his knees, shoulder especially the right shoulder.  Diagnosis of gout is been made elsewhere for his knees.  This is intermittently swollen.  He has tried \"3 different gout medicines\" without help.  He recalls 15 years ago there was fluid taken out from his knee.  We will arrange for " this to be further worked up.  X-rays of the knees and shoulders.  Labs as noted.  Meanwhile he is taking Tylenol and ibuprofen have asked him to consider taking naproxen/Aleve reasons for those were discussed.  We will meet as soon as the next appointment slot is available      ROS enquiry held for fever, ocular symptoms, rash, headache,  GI issues.  Today we also discussed the issues related to the current pandemic, the pros and cons of the current treatment plan, the CDC guidelines such as social distancing washing the hands covering the cough.  ALLERGIES:Patient has no known allergies.    PAST MEDICAL/ACTIVE PROBLEMS/MEDICATION/SOCIAL DATA  No past medical history on file.  Social History     Tobacco Use   Smoking Status Never Smoker   Smokeless Tobacco Never Used     Patient Active Problem List   Diagnosis     Polyarthralgia     Primary osteoarthritis involving multiple joints     Current Outpatient Medications   Medication Sig Dispense Refill     ibuprofen (ADVIL) 200 MG tablet 2 tabs       amoxicillin (AMOXIL) 250 MG capsule 1 cap(s)       celecoxib (CELEBREX) 100 MG capsule TAKE 1 CAPSULE BY MOUTH TWICE DAILY 180 capsule 0     DULoxetine (CYMBALTA) 30 MG capsule Take 1 capsule (30 mg total) by mouth daily. (Patient taking differently: Take 30 mg by mouth 2 (two) times a day. ) 30 capsule 2     No current facility-administered medications for this visit.          EXAMINATION:    Using the audio and video link as best as possible the constitutional, neck, neurologic, psych, skin, both upper extremities areas/organ system were evaluated during this assessment.  Some of the important findings: Alert, oriented, speech fluent.    Able to fully flex the digits, into fists bilaterally, wrist and elbow range of motion appear normal, abduction of the shoulder is normal.      LAB / IMAGING DATA:  ALT   Date Value Ref Range Status   11/09/2020 14 0 - 45 U/L Final   03/11/2020 12 0 - 45 U/L Final     Albumin   Date Value  Ref Range Status   11/09/2020 4.4 3.5 - 5.0 g/dL Final   03/11/2020 3.9 3.5 - 5.0 g/dL Final   02/21/2020 4.3 3.5 - 5.0 g/dL Final     Creatinine   Date Value Ref Range Status   11/09/2020 0.81 0.70 - 1.30 mg/dL Final   03/11/2020 0.97 0.70 - 1.30 mg/dL Final   02/21/2020 0.88 0.70 - 1.30 mg/dL Final       WBC   Date Value Ref Range Status   11/09/2020 6.3 4.0 - 11.0 thou/uL Final   03/11/2020 5.1 4.0 - 11.0 thou/uL Final     Hemoglobin   Date Value Ref Range Status   11/09/2020 13.4 (L) 14.0 - 18.0 g/dL Final   03/11/2020 13.7 (L) 14.0 - 18.0 g/dL Final     Platelets   Date Value Ref Range Status   11/09/2020 253 140 - 440 thou/uL Final   03/11/2020 274 140 - 440 thou/uL Final       Lab Results   Component Value Date    RF <15.0 12/23/2019    SEDRATE 16 (H) 03/11/2020

## 2021-07-03 NOTE — ADDENDUM NOTE
Addendum Note by Brianna Covarrubias MBBS at 4/1/2020 10:30 AM     Author: Brianna Covarrubias MBBS Service: -- Author Type: Physician    Filed: 4/1/2020 10:58 AM Encounter Date: 4/1/2020 Status: Signed    : Brianna Covarrubias MBBS (Physician)    Addended by: BRIANNA COVARRUBIAS on: 4/1/2020 10:58 AM        Modules accepted: Level of Service

## 2023-05-19 ENCOUNTER — LAB REQUISITION (OUTPATIENT)
Dept: LAB | Facility: CLINIC | Age: 63
End: 2023-05-19
Payer: COMMERCIAL

## 2023-05-19 DIAGNOSIS — A63.0 ANOGENITAL (VENEREAL) WARTS: ICD-10-CM

## 2023-05-19 PROCEDURE — 80061 LIPID PANEL: CPT | Mod: ORL | Performed by: STUDENT IN AN ORGANIZED HEALTH CARE EDUCATION/TRAINING PROGRAM

## 2023-05-19 PROCEDURE — 80053 COMPREHEN METABOLIC PANEL: CPT | Mod: ORL | Performed by: STUDENT IN AN ORGANIZED HEALTH CARE EDUCATION/TRAINING PROGRAM

## 2023-05-19 PROCEDURE — 84550 ASSAY OF BLOOD/URIC ACID: CPT | Mod: ORL | Performed by: STUDENT IN AN ORGANIZED HEALTH CARE EDUCATION/TRAINING PROGRAM

## 2023-05-20 LAB
ALBUMIN SERPL BCG-MCNC: 4.5 G/DL (ref 3.5–5.2)
ALP SERPL-CCNC: 108 U/L (ref 40–129)
ALT SERPL W P-5'-P-CCNC: 13 U/L (ref 10–50)
ANION GAP SERPL CALCULATED.3IONS-SCNC: 9 MMOL/L (ref 7–15)
AST SERPL W P-5'-P-CCNC: 21 U/L (ref 10–50)
BILIRUB SERPL-MCNC: 0.3 MG/DL
BUN SERPL-MCNC: 18.1 MG/DL (ref 8–23)
CALCIUM SERPL-MCNC: 9.4 MG/DL (ref 8.8–10.2)
CHLORIDE SERPL-SCNC: 104 MMOL/L (ref 98–107)
CHOLEST SERPL-MCNC: 267 MG/DL
CREAT SERPL-MCNC: 1.09 MG/DL (ref 0.67–1.17)
DEPRECATED HCO3 PLAS-SCNC: 24 MMOL/L (ref 22–29)
GFR SERPL CREATININE-BSD FRML MDRD: 77 ML/MIN/1.73M2
GLUCOSE SERPL-MCNC: 79 MG/DL (ref 70–99)
HDLC SERPL-MCNC: 48 MG/DL
LDLC SERPL CALC-MCNC: 179 MG/DL
NONHDLC SERPL-MCNC: 219 MG/DL
POTASSIUM SERPL-SCNC: 4.4 MMOL/L (ref 3.4–5.3)
PROT SERPL-MCNC: 6.6 G/DL (ref 6.4–8.3)
SODIUM SERPL-SCNC: 137 MMOL/L (ref 136–145)
TRIGL SERPL-MCNC: 202 MG/DL
URATE SERPL-MCNC: 2.8 MG/DL (ref 3.4–7)

## 2023-07-17 ENCOUNTER — TRANSCRIBE ORDERS (OUTPATIENT)
Dept: OTHER | Age: 63
End: 2023-07-17

## 2023-07-17 DIAGNOSIS — Z12.11 SCREEN FOR COLON CANCER: Primary | ICD-10-CM

## 2024-02-23 ENCOUNTER — LAB REQUISITION (OUTPATIENT)
Dept: LAB | Facility: CLINIC | Age: 64
End: 2024-02-23
Payer: COMMERCIAL

## 2024-02-23 DIAGNOSIS — M25.50 PAIN IN UNSPECIFIED JOINT: ICD-10-CM

## 2024-02-23 DIAGNOSIS — Z87.39 PERSONAL HISTORY OF OTHER DISEASES OF THE MUSCULOSKELETAL SYSTEM AND CONNECTIVE TISSUE: ICD-10-CM

## 2024-02-23 DIAGNOSIS — E78.5 HYPERLIPIDEMIA, UNSPECIFIED: ICD-10-CM

## 2024-02-23 LAB — ERYTHROCYTE [SEDIMENTATION RATE] IN BLOOD BY WESTERGREN METHOD: 19 MM/HR (ref 0–20)

## 2024-02-23 PROCEDURE — 84550 ASSAY OF BLOOD/URIC ACID: CPT | Mod: ORL | Performed by: STUDENT IN AN ORGANIZED HEALTH CARE EDUCATION/TRAINING PROGRAM

## 2024-02-23 PROCEDURE — 86038 ANTINUCLEAR ANTIBODIES: CPT | Mod: ORL | Performed by: STUDENT IN AN ORGANIZED HEALTH CARE EDUCATION/TRAINING PROGRAM

## 2024-02-23 PROCEDURE — 86200 CCP ANTIBODY: CPT | Mod: ORL | Performed by: STUDENT IN AN ORGANIZED HEALTH CARE EDUCATION/TRAINING PROGRAM

## 2024-02-23 PROCEDURE — 85652 RBC SED RATE AUTOMATED: CPT | Mod: ORL | Performed by: STUDENT IN AN ORGANIZED HEALTH CARE EDUCATION/TRAINING PROGRAM

## 2024-02-23 PROCEDURE — 80061 LIPID PANEL: CPT | Mod: ORL | Performed by: STUDENT IN AN ORGANIZED HEALTH CARE EDUCATION/TRAINING PROGRAM

## 2024-02-24 LAB
CHOLEST SERPL-MCNC: 199 MG/DL
FASTING STATUS PATIENT QL REPORTED: ABNORMAL
HDLC SERPL-MCNC: 67 MG/DL
LDLC SERPL CALC-MCNC: 107 MG/DL
NONHDLC SERPL-MCNC: 132 MG/DL
TRIGL SERPL-MCNC: 125 MG/DL
URATE SERPL-MCNC: 2.2 MG/DL (ref 3.4–7)

## 2024-02-26 LAB — CCP AB SER IA-ACNC: 1.2 U/ML

## 2024-02-27 LAB — ANA SER QL IF: NEGATIVE

## 2025-01-21 ENCOUNTER — LAB REQUISITION (OUTPATIENT)
Dept: LAB | Facility: CLINIC | Age: 65
End: 2025-01-21
Payer: COMMERCIAL

## 2025-01-21 DIAGNOSIS — G89.29 OTHER CHRONIC PAIN: ICD-10-CM

## 2025-01-21 LAB — ERYTHROCYTE [SEDIMENTATION RATE] IN BLOOD BY WESTERGREN METHOD: 33 MM/HR (ref 0–20)

## 2025-01-22 LAB
ANA SER QL IF: NEGATIVE
B BURGDOR IGG+IGM SER QL: 0.06
CRP SERPL-MCNC: <3 MG/L
RHEUMATOID FACT SERPL-ACNC: <10 IU/ML
URATE SERPL-MCNC: 2.5 MG/DL (ref 3.4–7)

## 2025-04-26 ENCOUNTER — OFFICE VISIT (OUTPATIENT)
Dept: URGENT CARE | Facility: URGENT CARE | Age: 65
End: 2025-04-26
Payer: COMMERCIAL

## 2025-04-26 VITALS
RESPIRATION RATE: 16 BRPM | TEMPERATURE: 97.9 F | WEIGHT: 171 LBS | HEART RATE: 50 BPM | DIASTOLIC BLOOD PRESSURE: 95 MMHG | OXYGEN SATURATION: 99 % | SYSTOLIC BLOOD PRESSURE: 154 MMHG

## 2025-04-26 DIAGNOSIS — M77.8 TENDINITIS OF RIGHT SHOULDER: Primary | ICD-10-CM

## 2025-04-26 PROCEDURE — 99203 OFFICE O/P NEW LOW 30 MIN: CPT | Performed by: EMERGENCY MEDICINE

## 2025-04-26 PROCEDURE — 3077F SYST BP >= 140 MM HG: CPT | Performed by: EMERGENCY MEDICINE

## 2025-04-26 PROCEDURE — 3080F DIAST BP >= 90 MM HG: CPT | Performed by: EMERGENCY MEDICINE

## 2025-04-26 RX ORDER — PREDNISONE 20 MG/1
TABLET ORAL
Qty: 20 TABLET | Refills: 0 | Status: SHIPPED | OUTPATIENT
Start: 2025-04-26

## 2025-04-26 NOTE — PROGRESS NOTES
CHIEF COMPLAINT: Right shoulder pain      HPI: Patient is a 65-year-old shane who developed pain in the right shoulder region which radiates to his bicep region which is markedly worse with movement.  He has had a history of shoulder and back issues in the past but never had pain like this before.  There was no sophie injury but patient describes doing tiling above his head this last week which possibly triggered the symptoms.  No numbness or tingling distally in the right arm.  Patient took 2 Aleve today as first time taking medicine for the symptoms.    ROS: See HPI otherwise normal.    No Known Allergies   Current Outpatient Medications   Medication Sig Dispense Refill    predniSONE (DELTASONE) 20 MG tablet Take 3 tabs by mouth daily x 3 days, then 2 tabs daily x 3 days, then 1 tab daily x 3 days, then 1/2 tab daily x 3 days. 20 tablet 0    ibuprofen (ADVIL) 200 MG tablet [IBUPROFEN (ADVIL) 200 MG TABLET] 2 tabs           PE: No acute distress.  Afebrile.  Examination of patient's right arm reveals no palpable tenderness about the shoulder or bicep region.  No warmth or effusion.  Bicep musculature is intact.  He has normal strength distal to the elbow.  Sensorimotor exam are intact.  He has marked pain with internal and external rotation.        TREATMENT: None.      ASSESSMENT: Tendinitis right shoulder possible rotator cuff etiology.      DIAGNOSIS: Tendinitis right shoulder      PLAN: Take over Aleve 2 tablets twice daily with food.  Take prednisone as instructed.  See AVS for additional instructions.

## 2025-04-26 NOTE — PROGRESS NOTES
Urgent Care Clinic Visit    Chief Complaint   Patient presents with    Shoulder Pain     Right Shoulder Pain x 1 Week and Bicep Area. Gets Shoulder Injections. Last Injection Was About a Month Ago              4/26/2025    11:23 AM   Additional Questions   Roomed by Melita Lopez CMA   Accompanied by Self

## 2025-04-26 NOTE — PATIENT INSTRUCTIONS
Take 2 Aleve morning and night) every 12 hours) with food.  Do so for 7 to 10 days.  Prednisone as instructed  Warm soaks in the shower twice daily, consider heating pad to the area.  Quiet use of right arm while pain continues.  Arrange follow-up with PCP on Monday to be seen in 1 week.

## 2025-08-12 ENCOUNTER — LAB REQUISITION (OUTPATIENT)
Dept: LAB | Facility: CLINIC | Age: 65
End: 2025-08-12
Payer: COMMERCIAL

## 2025-08-12 DIAGNOSIS — D53.9 NUTRITIONAL ANEMIA, UNSPECIFIED: ICD-10-CM

## 2025-08-12 LAB — FOLATE SERPL-MCNC: 17 NG/ML (ref 4.6–34.8)

## 2025-08-13 LAB
FERRITIN SERPL-MCNC: 459 NG/ML (ref 31–409)
IRON BINDING CAPACITY (ROCHE): 285 UG/DL (ref 240–430)
IRON SATN MFR SERPL: 25 % (ref 15–46)
IRON SERPL-MCNC: 72 UG/DL (ref 61–157)
VIT B12 SERPL-MCNC: 547 PG/ML (ref 232–1245)